# Patient Record
Sex: MALE | Race: WHITE | NOT HISPANIC OR LATINO | Employment: FULL TIME | URBAN - METROPOLITAN AREA
[De-identification: names, ages, dates, MRNs, and addresses within clinical notes are randomized per-mention and may not be internally consistent; named-entity substitution may affect disease eponyms.]

---

## 2017-07-10 ENCOUNTER — HOSPITAL ENCOUNTER (EMERGENCY)
Facility: HOSPITAL | Age: 27
Discharge: HOME | End: 2017-07-10
Attending: EMERGENCY MEDICINE | Admitting: EMERGENCY MEDICINE

## 2017-07-10 ENCOUNTER — APPOINTMENT (OUTPATIENT)
Dept: CT IMAGING | Facility: HOSPITAL | Age: 27
End: 2017-07-10

## 2017-07-10 VITALS
RESPIRATION RATE: 16 BRPM | WEIGHT: 207 LBS | DIASTOLIC BLOOD PRESSURE: 83 MMHG | HEIGHT: 73 IN | SYSTOLIC BLOOD PRESSURE: 128 MMHG | HEART RATE: 53 BPM | OXYGEN SATURATION: 99 % | TEMPERATURE: 97.9 F | BODY MASS INDEX: 27.43 KG/M2

## 2017-07-10 DIAGNOSIS — R79.89 ELEVATED SERUM CREATININE: ICD-10-CM

## 2017-07-10 DIAGNOSIS — N23 RENAL COLIC ON RIGHT SIDE: ICD-10-CM

## 2017-07-10 DIAGNOSIS — N13.30 HYDRONEPHROSIS, RIGHT: ICD-10-CM

## 2017-07-10 DIAGNOSIS — N20.1 URETEROLITHIASIS: Primary | ICD-10-CM

## 2017-07-10 LAB
ALBUMIN SERPL-MCNC: 5 G/DL (ref 3.2–4.8)
ALBUMIN/GLOB SERPL: 1.8 G/DL (ref 1.5–2.5)
ALP SERPL-CCNC: 94 U/L (ref 25–100)
ALT SERPL W P-5'-P-CCNC: 27 U/L (ref 7–40)
ANION GAP SERPL CALCULATED.3IONS-SCNC: 15 MMOL/L (ref 3–11)
AST SERPL-CCNC: 40 U/L (ref 0–33)
BASOPHILS # BLD AUTO: 0.03 10*3/MM3 (ref 0–0.2)
BASOPHILS NFR BLD AUTO: 0.3 % (ref 0–1)
BILIRUB SERPL-MCNC: 0.9 MG/DL (ref 0.3–1.2)
BILIRUB UR QL STRIP: NEGATIVE
BUN BLD-MCNC: 25 MG/DL (ref 9–23)
BUN/CREAT SERPL: 15.6 (ref 7–25)
CALCIUM SPEC-SCNC: 10.6 MG/DL (ref 8.7–10.4)
CHLORIDE SERPL-SCNC: 100 MMOL/L (ref 99–109)
CLARITY UR: CLEAR
CO2 SERPL-SCNC: 25 MMOL/L (ref 20–31)
COLOR UR: YELLOW
CREAT BLD-MCNC: 1.6 MG/DL (ref 0.6–1.3)
DEPRECATED RDW RBC AUTO: 41.5 FL (ref 37–54)
EOSINOPHIL # BLD AUTO: 0.03 10*3/MM3 (ref 0–0.3)
EOSINOPHIL NFR BLD AUTO: 0.3 % (ref 0–3)
ERYTHROCYTE [DISTWIDTH] IN BLOOD BY AUTOMATED COUNT: 13 % (ref 11.3–14.5)
GFR SERPL CREATININE-BSD FRML MDRD: 52 ML/MIN/1.73
GLOBULIN UR ELPH-MCNC: 2.8 GM/DL
GLUCOSE BLD-MCNC: 107 MG/DL (ref 70–100)
GLUCOSE UR STRIP-MCNC: NEGATIVE MG/DL
HCT VFR BLD AUTO: 45.1 % (ref 38.9–50.9)
HGB BLD-MCNC: 14.8 G/DL (ref 13.1–17.5)
HGB UR QL STRIP.AUTO: NEGATIVE
HOLD SPECIMEN: NORMAL
HOLD SPECIMEN: NORMAL
IMM GRANULOCYTES # BLD: 0.03 10*3/MM3 (ref 0–0.03)
IMM GRANULOCYTES NFR BLD: 0.3 % (ref 0–0.6)
KETONES UR QL STRIP: ABNORMAL
LEUKOCYTE ESTERASE UR QL STRIP.AUTO: NEGATIVE
LIPASE SERPL-CCNC: 21 U/L (ref 6–51)
LYMPHOCYTES # BLD AUTO: 1.31 10*3/MM3 (ref 0.6–4.8)
LYMPHOCYTES NFR BLD AUTO: 14.4 % (ref 24–44)
MCH RBC QN AUTO: 28.6 PG (ref 27–31)
MCHC RBC AUTO-ENTMCNC: 32.8 G/DL (ref 32–36)
MCV RBC AUTO: 87.2 FL (ref 80–99)
MONOCYTES # BLD AUTO: 0.9 10*3/MM3 (ref 0–1)
MONOCYTES NFR BLD AUTO: 9.9 % (ref 0–12)
NEUTROPHILS # BLD AUTO: 6.79 10*3/MM3 (ref 1.5–8.3)
NEUTROPHILS NFR BLD AUTO: 74.8 % (ref 41–71)
NITRITE UR QL STRIP: NEGATIVE
PH UR STRIP.AUTO: 6.5 [PH] (ref 5–8)
PLATELET # BLD AUTO: 203 10*3/MM3 (ref 150–450)
PMV BLD AUTO: 11.1 FL (ref 6–12)
POTASSIUM BLD-SCNC: 4.1 MMOL/L (ref 3.5–5.5)
PROT SERPL-MCNC: 7.8 G/DL (ref 5.7–8.2)
PROT UR QL STRIP: ABNORMAL
RBC # BLD AUTO: 5.17 10*6/MM3 (ref 4.2–5.76)
SODIUM BLD-SCNC: 140 MMOL/L (ref 132–146)
SP GR UR STRIP: >=1.03 (ref 1–1.03)
UROBILINOGEN UR QL STRIP: ABNORMAL
WBC NRBC COR # BLD: 9.09 10*3/MM3 (ref 3.5–10.8)
WHOLE BLOOD HOLD SPECIMEN: NORMAL
WHOLE BLOOD HOLD SPECIMEN: NORMAL

## 2017-07-10 PROCEDURE — 80053 COMPREHEN METABOLIC PANEL: CPT | Performed by: EMERGENCY MEDICINE

## 2017-07-10 PROCEDURE — 25010000002 ONDANSETRON PER 1 MG: Performed by: EMERGENCY MEDICINE

## 2017-07-10 PROCEDURE — 96361 HYDRATE IV INFUSION ADD-ON: CPT

## 2017-07-10 PROCEDURE — 96376 TX/PRO/DX INJ SAME DRUG ADON: CPT

## 2017-07-10 PROCEDURE — 96375 TX/PRO/DX INJ NEW DRUG ADDON: CPT

## 2017-07-10 PROCEDURE — 74176 CT ABD & PELVIS W/O CONTRAST: CPT

## 2017-07-10 PROCEDURE — 96374 THER/PROPH/DIAG INJ IV PUSH: CPT

## 2017-07-10 PROCEDURE — 85025 COMPLETE CBC W/AUTO DIFF WBC: CPT | Performed by: EMERGENCY MEDICINE

## 2017-07-10 PROCEDURE — 81003 URINALYSIS AUTO W/O SCOPE: CPT | Performed by: EMERGENCY MEDICINE

## 2017-07-10 PROCEDURE — 83690 ASSAY OF LIPASE: CPT | Performed by: EMERGENCY MEDICINE

## 2017-07-10 PROCEDURE — 25010000002 HYDROMORPHONE PER 4 MG: Performed by: EMERGENCY MEDICINE

## 2017-07-10 PROCEDURE — 99284 EMERGENCY DEPT VISIT MOD MDM: CPT

## 2017-07-10 RX ORDER — ONDANSETRON 2 MG/ML
4 INJECTION INTRAMUSCULAR; INTRAVENOUS ONCE
Status: COMPLETED | OUTPATIENT
Start: 2017-07-10 | End: 2017-07-10

## 2017-07-10 RX ORDER — ONDANSETRON 4 MG/1
4 TABLET, FILM COATED ORAL EVERY 6 HOURS PRN
Qty: 15 TABLET | Refills: 0 | OUTPATIENT
Start: 2017-07-10 | End: 2019-05-09

## 2017-07-10 RX ORDER — TAMSULOSIN HYDROCHLORIDE 0.4 MG/1
1 CAPSULE ORAL DAILY
Qty: 5 CAPSULE | Refills: 0 | OUTPATIENT
Start: 2017-07-10 | End: 2019-05-09

## 2017-07-10 RX ORDER — HYDROCODONE BITARTRATE AND ACETAMINOPHEN 5; 325 MG/1; MG/1
1 TABLET ORAL EVERY 6 HOURS PRN
Qty: 20 TABLET | Refills: 0 | OUTPATIENT
Start: 2017-07-10 | End: 2019-05-09

## 2017-07-10 RX ORDER — SODIUM CHLORIDE 0.9 % (FLUSH) 0.9 %
10 SYRINGE (ML) INJECTION AS NEEDED
Status: DISCONTINUED | OUTPATIENT
Start: 2017-07-10 | End: 2017-07-10 | Stop reason: HOSPADM

## 2017-07-10 RX ORDER — DEXTROAMPHETAMINE SACCHARATE, AMPHETAMINE ASPARTATE, DEXTROAMPHETAMINE SULFATE AND AMPHETAMINE SULFATE 2.5; 2.5; 2.5; 2.5 MG/1; MG/1; MG/1; MG/1
12.5 TABLET ORAL DAILY
COMMUNITY
End: 2019-07-31 | Stop reason: HOSPADM

## 2017-07-10 RX ORDER — HYDROMORPHONE HYDROCHLORIDE 1 MG/ML
0.5 INJECTION, SOLUTION INTRAMUSCULAR; INTRAVENOUS; SUBCUTANEOUS ONCE
Status: COMPLETED | OUTPATIENT
Start: 2017-07-10 | End: 2017-07-10

## 2017-07-10 RX ADMIN — HYDROMORPHONE HYDROCHLORIDE 0.5 MG: 1 INJECTION, SOLUTION INTRAMUSCULAR; INTRAVENOUS; SUBCUTANEOUS at 08:46

## 2017-07-10 RX ADMIN — HYDROMORPHONE HYDROCHLORIDE 1 MG: 1 INJECTION, SOLUTION INTRAMUSCULAR; INTRAVENOUS; SUBCUTANEOUS at 07:56

## 2017-07-10 RX ADMIN — SODIUM CHLORIDE 1000 ML: 9 INJECTION, SOLUTION INTRAVENOUS at 07:50

## 2017-07-10 RX ADMIN — ONDANSETRON 4 MG: 2 INJECTION INTRAMUSCULAR; INTRAVENOUS at 07:52

## 2017-07-10 NOTE — DISCHARGE INSTRUCTIONS
Push fluids for the next couple of days.    Establish primary care with Dr. Micah Garcia.    Follow up with Dr. Ayesha Alvarez, urology this week.    Recheck your creatinine in the next two to three days.    Immediately return to the emergency department for any new or worsening symptoms.     Information regarding Risks and Benefits When using Opioids and Other Controlled Substances to include Storage and Disposal of Medications    When considering the use of opioids and other controlled substances for the control of pain, anxiety, or for other medical purposes, you need to know of not only the benefits of these drugs but also of potential risks in using these drugs. These drugs, as well as more drugs, have beneficial uses; which is why their use is being considered in your   care, but they have risks involved in their use, too.    Opioids:  Opioids such as hydrocodone, oxycodone, hydromorphone, and codeine are pain relieving drugs, some more potent than others. They are most useful for moderate to more severe painful conditions. Risks include sedation, loss of coordination, decreased concentration, and decreased breathing with possibility of loss of consciousness or even death, especially if used in doses higher than prescribed. Improper usage can lead to addiction, tolerance, or overdose. In addition, many of these drugs are combined with acetaminophen (Tylenol) which can damage or destroy our liver when used excessively.  Alternatives to opioids are useful for mild to moderate pain and include ibuprofen (Motrin), naproxen (Aleve), aspirin, and acetaminophen (Tylenol). As with other drugs, these medications should be used according to directions on the label or from your doctor, as overuse can cause harm.    Benzodiazepines:  This group of drugs include: alprazolam (Xanax), diazepam (Valium), lorazepam (Ativan), and clonazepam (Klonopin). These drugs are used to control anxiety symptoms including anxiety and panic  attacks. Risks using these drugs include: sedation, loss of coordination, decreased ability to concentrate, effects on memory, and decreased breathing with possibility of loss of consciousness or even death. Improper and prolonged usage can lead to addiction. An alternative without addiction potential is hydroxyzine (Vistrail).    Other Controlled Substance:  This group includes Soma, Tramadol, stimulant drugs such as Ritalin, and others. Stimulant drugs are not medications that are prescribed by ER doctors. Soma and Tramadol have sedative and addictive affects similar to opioids with the same dangers mentioned with them.    Overdose:  If you or someone else are concerned that overdose has occurred, call 911 for transportation to the nearest hospital.    Storage and Disposal:  All medications need to be kept out of the reach of children or adults who cannot manage their own medicines. In addition, controlled substances can be targeted by criminals so extra precautions need to be taken to keep them in a safe, secure place. Any unused medications should be disposed of by flushing them down the toilet in the home setting or contact your local pharmacy.

## 2017-07-10 NOTE — ED PROVIDER NOTES
Subjective   HPI Comments: This patient is a 26-year-old male who complains of abdominal pain starting last night.  He tells me the pain started in the right flank area and radiates now down to the right inguinal area.  He has no history of hernias.  No history of gallbladder or pancreas abnormalities.  No history of appendicitis.  No history of kidney stones.  Patient states that he has had no vomiting no diarrhea.  Positive urinary frequency.  No change in color of the urine.  No dysuria.  The patient denies any recent trauma.  He does lift weights but does not have any other history of trauma.  He denies headache or vision changes.  Denies fevers or chills.  The pain started last night and has been coming in waves.  The ways last approximately 10 minutes.  He has had no history of similar complaints.  He does have a family history of kidney stones in his father but otherwise no personal history.  He denies any abdominal complaint on the left side of his abdomen.  He denies any discoloration of his stools or urine.  In summary, this is a 26-year-old gentleman with right-sided flank pain radiating around the right that started last night and has been ongoing times approximately 8 hours.    Past Medical History: No past medical history     Past Family History: Paternal kidney stones, maternal hx of SVT    Patient is a 26 y.o. male presenting with flank pain.   History provided by:  Patient  Flank Pain   Pain location:  R flank  Pain radiates to:  Epigastric region  Pain severity:  Unable to specify  Onset quality:  Sudden  Duration: Onset 2330 last night.  Timing:  Intermittent  Progression:  Worsening  Chronicity:  New  Relieved by:  None tried  Worsened by:  Nothing  Ineffective treatments:  None tried  Associated symptoms: no chest pain, no chills, no diarrhea, no dysuria, no fatigue, no fever, no hematuria, no nausea, no shortness of breath and no vomiting        Review of Systems   Constitutional: Negative.   Negative for chills, fatigue, fever and unexpected weight change.   HENT: Negative for dental problem, ear pain, hearing loss and sinus pressure.    Eyes: Negative for pain and visual disturbance.   Respiratory: Negative for chest tightness and shortness of breath.    Cardiovascular: Negative for chest pain, palpitations and leg swelling.   Gastrointestinal: Positive for abdominal pain. Negative for diarrhea, nausea, rectal pain and vomiting.   Genitourinary: Positive for flank pain, frequency and testicular pain. Negative for difficulty urinating, dysuria, hematuria and urgency.   Musculoskeletal: Positive for back pain. Negative for myalgias, neck pain and neck stiffness.   Neurological: Negative for seizures, syncope, speech difficulty, light-headedness and headaches.   Psychiatric/Behavioral: Negative for confusion.   All other systems reviewed and are negative.      Past Medical History:   Diagnosis Date   • ADHD (attention deficit hyperactivity disorder)    • Kidney stone        Allergies   Allergen Reactions   • Penicillins        Past Surgical History:   Procedure Laterality Date   • DENTAL PROCEDURE         History reviewed. No pertinent family history.    Social History     Social History   • Marital status: Single     Spouse name: N/A   • Number of children: N/A   • Years of education: N/A     Social History Main Topics   • Smoking status: Current Some Day Smoker   • Smokeless tobacco: None   • Alcohol use Yes      Comment: OCCASIONALLY   • Drug use: No   • Sexual activity: Not Asked     Other Topics Concern   • None     Social History Narrative   • None         Objective   Physical Exam   Constitutional: He is oriented to person, place, and time. He appears well-developed.  Non-toxic appearance. No distress.   HENT:   Head: Normocephalic and atraumatic.   Right Ear: Tympanic membrane, external ear and ear canal normal.   Left Ear: Tympanic membrane, external ear and ear canal normal.   Nose: Nose normal.  No nasal septal hematoma.   Mouth/Throat: Oropharynx is clear and moist. Mucous membranes are not dry. No oral lesions. No trismus in the jaw. No dental abscesses or uvula swelling. No posterior oropharyngeal erythema or tonsillar abscesses. No tonsillar exudate.   Eyes: EOM are normal. Pupils are equal, round, and reactive to light. Right conjunctiva is not injected. Left conjunctiva is not injected.   Neck: Normal range of motion. Neck supple. No JVD present. No tracheal tenderness present. No rigidity. Normal range of motion present.   Cardiovascular: Normal rate, regular rhythm and normal heart sounds.  Exam reveals no gallop and no friction rub.    Pulmonary/Chest: Effort normal and breath sounds normal. He has no wheezes. He has no rales. He exhibits no tenderness.   Abdominal: Soft. Bowel sounds are normal. He exhibits no distension, no pulsatile midline mass and no mass. There is tenderness (Right CVA). There is no rigidity, no rebound, no guarding and no tenderness at McBurney's point.   No signs of acute abdomen.  No pain at McBurney's point.  No pulsatile abdominal mass     Musculoskeletal: Normal range of motion. He exhibits no edema, tenderness or deformity.   Lymphadenopathy:     He has no cervical adenopathy.   Neurological: He is alert and oriented to person, place, and time. He has normal strength. He displays no tremor. No cranial nerve deficit. He exhibits normal muscle tone.   5/5 strength bilaterally with flexion and extension of fingers, wrist, elbows, knees and hips as well as plantar and dorsiflexion of the foot.     Skin: Skin is warm and dry. No rash noted. No erythema.   Psychiatric: He has a normal mood and affect. His speech is normal and behavior is normal. Judgment and thought content normal. He is attentive.   Nursing note and vitals reviewed.      Procedures         ED Course  ED Course   Comment By Time   The patient's mother is a nurse, which she informed me upon history taking.  I  "had a good conversation with her and the patient.  We have talked about the differential diagnosis in great detail.  I have explained the risks and benefits of CT imaging.  I've ordered a CT scan of the abdomen and pelvis and I have also ordered pain medication and antibiotic medication for the patient.  Patient has been able to verbalize an understanding of the plan.  All questions have been answered.  I do despite a discharge home.  I plan to have the patient follow-up with Dr. Micah Garcia M.D. to establish care from a primary care standpoint. Wayne Mcgregor MD 07/10 0725   Dr. Mcgregor is at the bedside re-evaluating the pt. [facility] 07/10 0822   SUMIT query complete. Treatment plan to include limited course of prescribed  controlled substance. Risks including addiction, benefits, and alternatives presented to patient. -KEVIN [facility] 07/10 0837   I reexamined the patient at approximately 8:30 AM.  I let he and his mother know about the results of the studies.  Specifically, I let the patient know about the positive proximal right ureteral stone.  We talked about the elevated creatinine, we talked about the hydronephrosis.  I talked about the need for repeat laboratory studies in the next 1-2 days.  We also talked very specifically about the importance of following up with urology in the next couple of days.  Certainly, if the patient's creatinine continues to climb, the patient may require urologic procedure for stone extraction.  The patient's pain is been extremely well controlled with Dilaudid, with the patient articulating that he felt \"awesome\" after medication had been provided.  I will give the patient another dose of pain medication prior to discharge but will be discharge the patient home with an impression that includes right-sided renal colic, ureterolithiasis, hydronephrosis, an elevated creatinine.  We will refer him to urology, Dr. Raul Moura, follow-up this week.  He should have his " creatinine rechecked in the next 2-3 days.  Return here immediately for new or changing concerns.  Maintain good fluid intake.  Patient and mother is a full understanding of the plan and appreciation for care.  Patient will be discharged home with his mom accordingly. Wayne Mcgregor MD 07/10 0852   No signs of infection on urinalysis. Wayne Mcgregor MD 07/10 0852     Recent Results (from the past 24 hour(s))   Comprehensive Metabolic Panel    Collection Time: 07/10/17  7:13 AM   Result Value Ref Range    Glucose 107 (H) 70 - 100 mg/dL    BUN 25 (H) 9 - 23 mg/dL    Creatinine 1.60 (H) 0.60 - 1.30 mg/dL    Sodium 140 132 - 146 mmol/L    Potassium 4.1 3.5 - 5.5 mmol/L    Chloride 100 99 - 109 mmol/L    CO2 25.0 20.0 - 31.0 mmol/L    Calcium 10.6 (H) 8.7 - 10.4 mg/dL    Total Protein 7.8 5.7 - 8.2 g/dL    Albumin 5.00 (H) 3.20 - 4.80 g/dL    ALT (SGPT) 27 7 - 40 U/L    AST (SGOT) 40 (H) 0 - 33 U/L    Alkaline Phosphatase 94 25 - 100 U/L    Total Bilirubin 0.9 0.3 - 1.2 mg/dL    eGFR Non African Amer 52 (L) >60 mL/min/1.73    Globulin 2.8 gm/dL    A/G Ratio 1.8 1.5 - 2.5 g/dL    BUN/Creatinine Ratio 15.6 7.0 - 25.0    Anion Gap 15.0 (H) 3.0 - 11.0 mmol/L   Lipase    Collection Time: 07/10/17  7:13 AM   Result Value Ref Range    Lipase 21 6 - 51 U/L   Light Blue Top    Collection Time: 07/10/17  7:13 AM   Result Value Ref Range    Extra Tube hold for add-on    Green Top (Gel)    Collection Time: 07/10/17  7:13 AM   Result Value Ref Range    Extra Tube Hold for add-ons.    Lavender Top    Collection Time: 07/10/17  7:13 AM   Result Value Ref Range    Extra Tube hold for add-on    Gold Top - SST    Collection Time: 07/10/17  7:13 AM   Result Value Ref Range    Extra Tube Hold for add-ons.    CBC Auto Differential    Collection Time: 07/10/17  7:13 AM   Result Value Ref Range    WBC 9.09 3.50 - 10.80 10*3/mm3    RBC 5.17 4.20 - 5.76 10*6/mm3    Hemoglobin 14.8 13.1 - 17.5 g/dL    Hematocrit 45.1 38.9 - 50.9 %    MCV  87.2 80.0 - 99.0 fL    MCH 28.6 27.0 - 31.0 pg    MCHC 32.8 32.0 - 36.0 g/dL    RDW 13.0 11.3 - 14.5 %    RDW-SD 41.5 37.0 - 54.0 fl    MPV 11.1 6.0 - 12.0 fL    Platelets 203 150 - 450 10*3/mm3    Neutrophil % 74.8 (H) 41.0 - 71.0 %    Lymphocyte % 14.4 (L) 24.0 - 44.0 %    Monocyte % 9.9 0.0 - 12.0 %    Eosinophil % 0.3 0.0 - 3.0 %    Basophil % 0.3 0.0 - 1.0 %    Immature Grans % 0.3 0.0 - 0.6 %    Neutrophils, Absolute 6.79 1.50 - 8.30 10*3/mm3    Lymphocytes, Absolute 1.31 0.60 - 4.80 10*3/mm3    Monocytes, Absolute 0.90 0.00 - 1.00 10*3/mm3    Eosinophils, Absolute 0.03 0.00 - 0.30 10*3/mm3    Basophils, Absolute 0.03 0.00 - 0.20 10*3/mm3    Immature Grans, Absolute 0.03 0.00 - 0.03 10*3/mm3   Urinalysis With / Culture If Indicated    Collection Time: 07/10/17  7:43 AM   Result Value Ref Range    Color, UA Yellow Yellow, Straw    Appearance, UA Clear Clear    pH, UA 6.5 5.0 - 8.0    Specific Gravity, UA >=1.030 1.001 - 1.030    Glucose, UA Negative Negative    Ketones, UA 15 mg/dL (1+) (A) Negative    Bilirubin, UA Negative Negative    Blood, UA Negative Negative    Protein, UA Trace (A) Negative    Leuk Esterase, UA Negative Negative    Nitrite, UA Negative Negative    Urobilinogen, UA 1.0 E.U./dL 0.2 - 1.0 E.U./dL     Note: In addition to lab results from this visit, the labs listed above may include labs taken at another facility or during a different encounter within the last 24 hours. Please correlate lab times with ED admission and discharge times for further clarification of the services performed during this visit.    CT Abdomen Pelvis Without Contrast   Final Result   4 mm moderately obstructing right proximal ureteral stone   with moderate hydronephrosis of the right kidney and perinephric   stranding.       D:  07/10/2017   E:  07/10/2017           This report was finalized on 7/10/2017 4:03 PM by Dr. Lucia Collins MD.            Vitals:    07/10/17 0706 07/10/17 0830 07/10/17 0845   BP:  "168/81 128/83    BP Location: Left arm     Patient Position: Sitting     Pulse: 63 (!) 47 53   Resp: 16     Temp: 97.9 °F (36.6 °C)     TempSrc: Oral     SpO2: 99% 94% 99%   Weight: 207 lb (93.9 kg)     Height: 73\" (185.4 cm)       Medications   sodium chloride 0.9 % bolus 1,000 mL (0 mL Intravenous Stopped 7/10/17 0919)   ondansetron (ZOFRAN) injection 4 mg (4 mg Intravenous Given 7/10/17 0752)   HYDROmorphone (DILAUDID) injection 1 mg (1 mg Intravenous Given 7/10/17 0756)   HYDROmorphone (DILAUDID) injection 0.5 mg (0.5 mg Intravenous Given 7/10/17 0870)     ECG/EMG Results (last 24 hours)     ** No results found for the last 24 hours. **                          MDM    Final diagnoses:   Ureterolithiasis   Hydronephrosis, right   Elevated serum creatinine   Renal colic on right side   EMR Dragon/Transcription disclaimer:  Much of this encounter note is an electronic transcription/translation of spoken language to printed text. The electronic translation of spoken language may permit erroneous, or at times, nonsensical words or phrases to be inadvertently transcribed; Although I have reviewed the note for such errors, some may still exist.      Documentation assistance provided by josé miguel BARTHOLOMEW.  Information recorded by the josé miguel was done at my direction and has been verified and validated by me.     Ana Bartholomew  07/10/17 0722       Ana Bartholomew  07/10/17 0727       Wayne Mcgregor MD  07/10/17 5727    "

## 2019-05-10 ENCOUNTER — OFFICE VISIT (OUTPATIENT)
Dept: ORTHOPEDIC SURGERY | Facility: CLINIC | Age: 29
End: 2019-05-10

## 2019-05-10 VITALS — WEIGHT: 188.05 LBS | OXYGEN SATURATION: 98 % | BODY MASS INDEX: 24.92 KG/M2 | HEART RATE: 91 BPM | HEIGHT: 73 IN

## 2019-05-10 DIAGNOSIS — M20.011 MALLET FINGER OF RIGHT HAND: Primary | ICD-10-CM

## 2019-05-10 PROCEDURE — 99204 OFFICE O/P NEW MOD 45 MIN: CPT | Performed by: ORTHOPAEDIC SURGERY

## 2019-05-10 RX ORDER — DEXTROAMPHETAMINE SACCHARATE, AMPHETAMINE ASPARTATE, DEXTROAMPHETAMINE SULFATE AND AMPHETAMINE SULFATE 2.5; 2.5; 2.5; 2.5 MG/1; MG/1; MG/1; MG/1
TABLET ORAL
COMMUNITY
End: 2021-05-14

## 2019-05-10 NOTE — PROGRESS NOTES
[facility] Note    Subjective     Chief Complaint   Patient presents with   • Left Hand - Pain     Small finger        HPI      David Ramesh is a 28 y.o. male.  He was playing volleyball and injured his right small finger DIP joint with a mallet finger deformity.  Pain is 3 out of 10.  He was treated with a splint.  He had a history of a mallet finger on his left hand that was never treated appropriately and he does not want to be left with a deformity to his right small finger        Past Medical History:   Diagnosis Date   • ADHD (attention deficit hyperactivity disorder)    • Kidney stone       Past Surgical History:   Procedure Laterality Date   • DENTAL PROCEDURE        History reviewed. No pertinent family history.  Social History     Socioeconomic History   • Marital status: Single     Spouse name: Not on file   • Number of children: Not on file   • Years of education: Not on file   • Highest education level: Not on file   Tobacco Use   • Smoking status: Current Some Day Smoker   • Smokeless tobacco: Never Used   Substance and Sexual Activity   • Alcohol use: Yes     Comment: OCCASIONALLY   • Drug use: No      Current Outpatient Medications on File Prior to Visit   Medication Sig Dispense Refill   • amphetamine-dextroamphetamine (ADDERALL) 10 MG tablet Take 12.5 mg by mouth Daily.     • amphetamine-dextroamphetamine (ADDERALL) 10 MG tablet Adderall       No current facility-administered medications on file prior to visit.       Allergies   Allergen Reactions   • Penicillins         The following portions of the patient's history were reviewed and updated as appropriate: allergies, current medications, past family history, past medical history, past social history, past surgical history and problem list.    Review of Systems   Constitutional: Negative.    HENT: Negative.    Eyes: Negative.    Respiratory: Negative.    Cardiovascular: Negative.    Gastrointestinal: Negative.   "  Endocrine: Negative.    Genitourinary: Negative.    Musculoskeletal: Positive for arthralgias and joint swelling.   Skin: Negative.    Allergic/Immunologic: Negative.    Neurological: Negative.    Hematological: Negative.    Psychiatric/Behavioral: Negative.         Objective      Physical Exam  Pulse 91   Ht 185 cm (72.84\")   Wt 85.3 kg (188 lb 0.8 oz)   SpO2 98%   BMI 24.92 kg/m²     Body mass index is 24.92 kg/m².        GENERAL APPEARANCE: awake, alert & oriented x 3, in no acute distress and well developed, well nourished  PSYCH: normal mood and affect  LUNGS:  breathing nonlabored, no wheezing  EYES: sclera anicteric, pupils equal  CARDIOVASCULAR: palpable pulses dorsalis pedis, palpable posterior tibial bilaterally. Capillary refill less than 2 seconds  INTEGUMENTARY: skin intact, no clubbing, cyanosis  NEUROLOGIC:  Normal Sensation and reflexes             Ortho Exam  Neurologic   Left Upper Extremity    Left wrist extensors: 5/5    Left wrist flexors: 5/5    Left intrinsics: 5/5      Right Upper Extremity    Right wrist extensors: 5/5    Right wrist flexors: 5/5    Right intrinsics: 5/5    Musculoskeletal   Left Elbow    Forearm supination: AROM - 90 degrees    Forearm pronation: AROM - 90 degrees     Right Elbow    Forearm supination: AROM - 90 degrees    Forearm pronation: AROM - 90 degrees     Left Wrist    Flexion: AROM - 90 degrees    Extension: AROM - 90 degrees     Right Wrist    Flexion: AROM - 90 degrees    Extension: AROM - 90 degrees     Inspection and Palpation   Right Hand:      Tenderness -DIP joint with mallet finger deformity    Swelling - none    Crepitus - none    Muscle tone - no atrophy     Left Hand    Tenderness - none    Swelling - none    Crepitus - none    Muscle tone - no atrophy     Strength and Tone    Right  strength: good    Left  strength: good     Deformities, Malalignments, Discrepancies    None       Imaging/Studies  Imaging Results (last 7 days)     ** No " results found for the last 168 hours. **        I viewed his x-rays from May 9 which show a mallet finger fracture of the right small finger  Assessment/Plan        ICD-10-CM ICD-9-CM   1. Mallet finger of right hand M20.011 736.1     We discussed the treatment plan of splinting for 6 weeks in extension.  He will follow-up in 2 weeks with x-rays to make sure he is healing well and does not displace further.  I will see him back in 2 weeks.  Medical Decision Making  Management Options : over-the-counter medicine and close treatment of fracture or dislocation  Data/Risk: radiology tests and independent visualization of imaging, lab tests, or EMG/NCV    Jose Francisco Mcrae MD  05/10/19  10:42 AM         EMR Dragon/Transcription disclaimer:  Much of this encounter note is an electronic transcription of spoken language to printed text. Electronic transcription of spoken language may permit erroneous, or at times, nonsensical words or phrases to be inadvertently transcribed. Although I have reviewed the note for such errors, some may still exist.

## 2019-05-24 ENCOUNTER — OFFICE VISIT (OUTPATIENT)
Dept: ORTHOPEDIC SURGERY | Facility: CLINIC | Age: 29
End: 2019-05-24

## 2019-05-24 VITALS — BODY MASS INDEX: 24.92 KG/M2 | HEART RATE: 99 BPM | WEIGHT: 188.05 LBS | OXYGEN SATURATION: 100 % | HEIGHT: 73 IN

## 2019-05-24 DIAGNOSIS — M20.011 MALLET FINGER OF RIGHT HAND: Primary | ICD-10-CM

## 2019-05-24 PROCEDURE — 99213 OFFICE O/P EST LOW 20 MIN: CPT | Performed by: ORTHOPAEDIC SURGERY

## 2019-05-24 NOTE — PROGRESS NOTES
[facility] [place] [facility] Note    Subjective     Chief Complaint   Patient presents with   • Follow-up     2 week follow up - Mallet finger of right hand        HPI      David Ramesh is a 28 y.o. male.  He is follow-up right small finger mallet fracture.  7 2 weeks ago.  Pain is 3 out of 10 and throbbing.  He is in a brace.        Past Medical History:   Diagnosis Date   • ADHD (attention deficit hyperactivity disorder)    • Kidney stone       Past Surgical History:   Procedure Laterality Date   • DENTAL PROCEDURE        No family history on file.  Social History     Socioeconomic History   • Marital status: Single     Spouse name: Not on file   • Number of children: Not on file   • Years of education: Not on file   • Highest education level: Not on file   Tobacco Use   • Smoking status: Current Some Day Smoker   • Smokeless tobacco: Never Used   Substance and Sexual Activity   • Alcohol use: Yes     Comment: OCCASIONALLY   • Drug use: No      Current Outpatient Medications on File Prior to Visit   Medication Sig Dispense Refill   • amphetamine-dextroamphetamine (ADDERALL) 10 MG tablet Take 12.5 mg by mouth Daily.     • amphetamine-dextroamphetamine (ADDERALL) 10 MG tablet Adderall       No current facility-administered medications on file prior to visit.       Allergies   Allergen Reactions   • Penicillins         The following portions of the patient's history were reviewed and updated as appropriate: allergies, current medications, past family history, past medical history, past social history, past surgical history and problem list.    Review of Systems   Constitutional: Negative.    HENT: Negative.    Eyes: Negative.    Respiratory: Negative.    Cardiovascular: Negative.    Gastrointestinal: Negative.    Endocrine: Negative.    Genitourinary: Negative.    Musculoskeletal: Positive for joint swelling.        Mallet finger of right hand   Skin: Negative.    Allergic/Immunologic: Negative.   "  Neurological: Negative.    Hematological: Negative.    Psychiatric/Behavioral: Negative.         Objective      Physical Exam  Pulse 99   Ht 185 cm (72.84\")   Wt 85.3 kg (188 lb 0.8 oz)   SpO2 100%   BMI 24.92 kg/m²     Body mass index is 24.92 kg/m².        GENERAL APPEARANCE: awake, alert & oriented x 3, in no acute distress and well developed, well nourished  PSYCH: normal mood and affect      Ortho Exam  His right small finger has straighter appearance.  The skin is intact.  He is neurovascular intact.  Strength and motion not tested because of the fracture.    Imaging/Studies  Imaging Results (last 7 days)     Procedure Component Value Units Date/Time    XR Finger 2+ View Right [[id number]] Resulted:  05/24/19 1009     Updated:  05/24/19 1010    Narrative:       Right Hand small finger x-Ray  Indication: Pain  AP, Lateral,  views    Findings:  Healing fracture of right small finger mallet fracture  No bony lesion  Normal soft tissues  Normal joint spaces    prior studies were available for comparison.            Assessment/Plan        ICD-10-CM ICD-9-CM   1. Mallet finger of right hand M20.011 736.1       Orders Placed This Encounter   Procedures   • XR Finger 2+ View Right      He will continue the mallet finger splint.  I re-taped it for him today personally.  He will follow-up in 4 weeks for an x-ray.  Medical Decision Making  Management Options : over-the-counter medicine and close treatment of fracture or dislocation  Data/Risk: radiology tests and independent visualization of imaging, lab tests, or EMG/NCV    Jose Francisco Mcrae MD  05/24/19  10:10 AM         EMR Dragon/Transcription disclaimer:  Much of this encounter note is an electronic transcription of spoken language to printed text. Electronic transcription of spoken language may permit erroneous, or at times, nonsensical words or phrases to be inadvertently transcribed. Although I have reviewed the note for such errors, some may still " exist.

## 2019-06-21 ENCOUNTER — OFFICE VISIT (OUTPATIENT)
Dept: ORTHOPEDIC SURGERY | Facility: CLINIC | Age: 29
End: 2019-06-21

## 2019-06-21 VITALS — OXYGEN SATURATION: 99 % | BODY MASS INDEX: 24.51 KG/M2 | WEIGHT: 184.97 LBS | HEART RATE: 78 BPM | HEIGHT: 73 IN

## 2019-06-21 DIAGNOSIS — M20.011 MALLET FINGER OF RIGHT HAND: ICD-10-CM

## 2019-06-21 DIAGNOSIS — M79.645 FINGER PAIN, LEFT: ICD-10-CM

## 2019-06-21 DIAGNOSIS — M20.022 BOUTONNIERE DEFORMITY OF FINGER OF LEFT HAND: ICD-10-CM

## 2019-06-21 DIAGNOSIS — Z09 FRACTURE FOLLOW-UP: Primary | ICD-10-CM

## 2019-06-21 PROCEDURE — 99213 OFFICE O/P EST LOW 20 MIN: CPT | Performed by: ORTHOPAEDIC SURGERY

## 2019-06-21 NOTE — PROGRESS NOTES
[facility] [place] [facility] [place]    Subjective     Chief Complaint   Patient presents with   • Follow-up     4 week f/u fracture of right small finger mallet fracture   • Pain     left ring finger        HPI  David Ramesh is a 28 y.o. male.  He is here to follow-up for the right hand mallet finger but his new complaint is left middle finger deformity.    Past Medical History:   Diagnosis Date   • ADHD (attention deficit hyperactivity disorder)    • Kidney stone       Past Surgical History:   Procedure Laterality Date   • DENTAL PROCEDURE        No family history on file.  Social History     Socioeconomic History   • Marital status: Single     Spouse name: Not on file   • Number of children: Not on file   • Years of education: Not on file   • Highest education level: Not on file   Tobacco Use   • Smoking status: Current Some Day Smoker   • Smokeless tobacco: Never Used   Substance and Sexual Activity   • Alcohol use: Yes     Comment: OCCASIONALLY   • Drug use: No      Current Outpatient Medications on File Prior to Visit   Medication Sig Dispense Refill   • amphetamine-dextroamphetamine (ADDERALL) 10 MG tablet Take 12.5 mg by mouth Daily.     • amphetamine-dextroamphetamine (ADDERALL) 10 MG tablet Adderall       No current facility-administered medications on file prior to visit.       Allergies   Allergen Reactions   • Penicillins         The following portions of the patient's history were reviewed and updated as appropriate: allergies, current medications, past family history, past medical history, past social history, past surgical history and problem list.    Review of Systems   Constitutional: Negative.    HENT: Negative.    Eyes: Negative.    Respiratory: Negative.    Cardiovascular: Negative.    Gastrointestinal: Negative.    Endocrine: Negative.    Genitourinary: Negative.    Musculoskeletal: Positive for arthralgias and joint swelling.   Skin: Negative.    Allergic/Immunologic: Negative.   "  Neurological: Negative.    Hematological: Negative.    Psychiatric/Behavioral: Negative.         Objective      Physical Exam  Pulse 78   Ht 185 cm (72.84\")   Wt 83.9 kg (184 lb 15.5 oz)   SpO2 99%   BMI 24.51 kg/m²     Body mass index is 24.51 kg/m².    GENERAL APPEARANCE: awake, alert & oriented x 3, in no acute distress and well developed, well nourished  PSYCH: normal mood and affect      Ortho Exam  His right hand small finger mallet finger deformity is healing.  He has stiffness at the DIP to be expected.  He gets near full extension.  His new complaint is the boutonniere deformity to his left hand middle finger which is new.  Happened after fracturing the mallet finger on his right hand.  This first time I am seeing him for the left hand.    Imaging/Studies  Imaging Results (last 7 days)     Procedure Component Value Units Date/Time    XR Finger 2+ View Left [[id number]] Resulted:  06/21/19 1054     Updated:  06/21/19 1054    Narrative:       Bilateral Hand X-Ray  Indication: Pain  AP, Lateral, and Oblique views    Findings: Soft tissue deformity of the middle finger  No fracture  No bony lesion  Normal soft tissues  Normal joint spaces    No prior studies were available for comparison.      XR Finger 2+ View Right [[id number]] Resulted:  06/21/19 1053     Updated:  06/21/19 1054    Narrative:       Right Hand X-Ray  Indication: Pain  AP, Lateral, and Oblique views    Findings:  Healing mallet finger fracture of the small finger  No bony lesion  Normal soft tissues  Normal joint spaces    prior studies were available for comparison.            Assessment/Plan        ICD-10-CM ICD-9-CM   1. Fracture follow-up Z09 V67.4   2. Finger pain, left M79.645 729.5   3. Mallet finger of right hand M20.011 736.1   4. 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  I will refer him to hand surgeon for that.  He may come out of the brace for the right small finger and start therapy range of motion.  He has chronic issues with his left middle and ring finger and recommend he follow-up with a hand surgeon for that.    Medical Decision Making  Management Options : over-the-counter medicine and close treatment of fracture or dislocation  Data/Risk: radiology tests and independent visualization of imaging, lab tests, or EMG/NCV    Jose Francisco Mcrae MD  06/21/19  10:54 AM         EMR Dragon/Transcription disclaimer:  Much of this encounter note is an electronic transcription of spoken language to printed text. Electronic transcription of spoken language may permit erroneous, or at times, nonsensical words or phrases to be inadvertently transcribed. Although I have reviewed the note for such errors, some may still exist.

## 2021-03-20 ENCOUNTER — HOSPITAL ENCOUNTER (EMERGENCY)
Age: 31
Discharge: HOME | End: 2021-03-20
Payer: COMMERCIAL

## 2021-03-20 VITALS
RESPIRATION RATE: 16 BRPM | DIASTOLIC BLOOD PRESSURE: 75 MMHG | SYSTOLIC BLOOD PRESSURE: 137 MMHG | TEMPERATURE: 97.7 F | HEART RATE: 84 BPM | OXYGEN SATURATION: 99 %

## 2021-03-20 VITALS
DIASTOLIC BLOOD PRESSURE: 33 MMHG | RESPIRATION RATE: 18 BRPM | HEART RATE: 87 BPM | TEMPERATURE: 97.7 F | SYSTOLIC BLOOD PRESSURE: 123 MMHG | OXYGEN SATURATION: 98 %

## 2021-03-20 VITALS — BODY MASS INDEX: 25.7 KG/M2

## 2021-03-20 VITALS — SYSTOLIC BLOOD PRESSURE: 126 MMHG | HEART RATE: 91 BPM | DIASTOLIC BLOOD PRESSURE: 69 MMHG

## 2021-03-20 DIAGNOSIS — S00.83XA: ICD-10-CM
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
## 2021-03-20 DIAGNOSIS — S06.0X0A: Primary | ICD-10-CM
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
## 2021-03-20 PROCEDURE — 99282 EMERGENCY DEPT VISIT SF MDM: CPT

## 2021-03-20 PROCEDURE — 90471 IMMUNIZATION ADMIN: CPT

## 2021-03-20 PROCEDURE — 70450 CT HEAD/BRAIN W/O DYE: CPT

## 2021-03-20 PROCEDURE — 90714 TD VACC NO PRESV 7 YRS+ IM: CPT

## 2021-03-20 PROCEDURE — 72125 CT NECK SPINE W/O DYE: CPT

## 2021-03-20 PROCEDURE — 70486 CT MAXILLOFACIAL W/O DYE: CPT

## 2021-05-26 ENCOUNTER — OFFICE VISIT (OUTPATIENT)
Dept: ORTHOPEDIC SURGERY | Facility: CLINIC | Age: 31
End: 2021-05-26

## 2021-05-26 VITALS
HEART RATE: 84 BPM | BODY MASS INDEX: 26.43 KG/M2 | HEIGHT: 72 IN | DIASTOLIC BLOOD PRESSURE: 82 MMHG | WEIGHT: 195.11 LBS | SYSTOLIC BLOOD PRESSURE: 146 MMHG

## 2021-05-26 DIAGNOSIS — M79.672 BILATERAL FOOT PAIN: Primary | ICD-10-CM

## 2021-05-26 DIAGNOSIS — M79.671 BILATERAL FOOT PAIN: Primary | ICD-10-CM

## 2021-05-26 PROCEDURE — 99213 OFFICE O/P EST LOW 20 MIN: CPT | Performed by: ORTHOPAEDIC SURGERY

## 2021-05-26 NOTE — PROGRESS NOTES
NEW PATIENT    Patient: David Ramesh  : 1990    Primary Care Provider: Provider, No Known    Requesting Provider: As above    Pain of the Right Foot      History    Chief Complaint: Bilateral foot pain    History of Present Illness: This is a very pleasant 30-year-old gentleman with bilateral foot problems.  He is an .  He reports that he generally walks about 2 miles a day for exercise.  Around May 5 he received some difficult news about his family and over the next several days began walking 6 to 9 miles a day.  On the  he noted pain in the right proximal midfoot.  He points to the area of the cuneiforms.  He notes that it might have been a little bit swollen.  He was seen at urgent treatment and x-ray was done 2021.  I reviewed the films and the report.  It was read as possible old stress fracture of the third metatarsal.  I looked at the films and I see the cortical thickening of the third metatarsal but I think it is normal.  He was given a short boot.  The pain has improved significantly.  He notes that at the time it was 6-8 out of 10, now there are many times when he does not feel it at all.  Second problem is he was at a softball game and a ball hit the lateral aspect of his left foot over the cuboid.  He has noted some swelling in the region of the extensor digitorum brevis.  He reports that side is more sore than the right foot.    Current Outpatient Medications on File Prior to Visit   Medication Sig Dispense Refill   • [DISCONTINUED] indomethacin (INDOCIN) 50 MG capsule Take 1 capsule by mouth 3 (Three) Times a Day As Needed for Moderate Pain . 21 capsule 0     No current facility-administered medications on file prior to visit.      Allergies   Allergen Reactions   • Penicillins Unknown (See Comments)     As a child      Past Medical History:   Diagnosis Date   • ADHD (attention deficit hyperactivity disorder)    • Kidney stone    • Multiple fractures      Past Surgical  "History:   Procedure Laterality Date   • DENTAL PROCEDURE       No family history on file.   Social History     Socioeconomic History   • Marital status: Single     Spouse name: Not on file   • Number of children: Not on file   • Years of education: Not on file   • Highest education level: Not on file   Tobacco Use   • Smoking status: Former Smoker   • Smokeless tobacco: Never Used   • Tobacco comment: Quit 2 mo ago   Vaping Use   • Vaping Use: Never used   Substance and Sexual Activity   • Alcohol use: Yes     Comment: OCCASIONALLY   • Drug use: No   • Sexual activity: Defer        Review of Systems   Constitutional: Negative.    HENT: Negative.    Eyes: Negative.    Respiratory: Negative.    Cardiovascular: Negative.    Gastrointestinal: Negative.    Endocrine: Negative.    Genitourinary: Negative.    Musculoskeletal: Positive for arthralgias.   Skin: Negative.    Allergic/Immunologic: Negative.    Neurological: Negative.    Hematological: Negative.    Psychiatric/Behavioral: Negative.        The following portions of the patient's history were reviewed and updated as appropriate: allergies, current medications, past family history, past medical history, past social history, past surgical history and problem list.    Physical Exam:   /82   Pulse 84   Ht 182.9 cm (72.01\")   Wt 88.5 kg (195 lb 1.7 oz)   BMI 26.46 kg/m²   GENERAL: Body habitus: normal weight for height    Lower extremity edema: Right: none; Left: none    Varicose veins:  Right: none; Left: none    Gait: normal     Mental Status:  awake and alert; oriented to person, place, and time    Voice:  clear  SKIN:  Lower extremity: Normal    Hair Growth(lower extremity):  Right:normal; Left:  normal  NAILS: Toenails: normal  HEENT: Head: Normocephalic, atraumatic,  without obvious abnormality.  eye: normal external eye, no icterus  ears:normal external ears  PULM:  Repiratory effort normal  CV:  Dorsalis Pedis:  Right: 2+; Left:2+    Posterior " Tibial: Right:2+; Left:2+    Capillary Refill:  Brisk  MSK:  Hand:sensation intact      Tibia:  Right:  non tender; Left:  non tender      Ankle:  Right: non tender, ROM  normal and motor function  normal; Left:  non tender, ROM  normal and motor function  normal      Foot:  Right:  No tenderness at all throughout the foot, no swelling, no pain with compression of the forefoot, normal range of motion.; Left:  Mildly tender over the cuboid and extensor brevis muscle, mild swelling in that area, no pain with compression of the foot normal range of motion no other tenderness      NEURO: Heel Walking:  Right:  normal; Left:  normal    Toe Walking:  Right:  normal; Left:  normal     Wilton-Alex 5.07 monofilament test: normal    Lower extremity sensation: intact     Reflexes:  Biceps:  Right:  not tested; Left:  not tested           Quads:  Right:  not tested; Left:  not tested           Ankle:  Right:  not tested; Left:  not tested      Calf Atrophy:none    Motor Function: all 5/5         Medical Decision Making    Data Review:   ordered and reviewed x-rays today, reviewed radiology images, reviewed radiology results and reviewed outside records    Assessment and Plan/ Diagnosis/Treatment options:   1. Bilateral foot pain  I obtained weightbearing films of her feet today.  On the right I do not see any fracture.  I explained to him that stress fractures are generally negative on the initial x-ray.  I showed them the cortical thickening of the metatarsal and I think it is just normal in a healthy active 30-year-old male.  I explained that what I think happened is he probably did develop an early stress fracture, but by having it immobilized early and decreasing activity it stopped from becoming a complete stress fracture.  Given that he is entirely asymptomatic now I think he can wean out of the boot and slowly increase activity.  With respect to the left foot I think he had a direct contusion.  I do not see any  fractures.  I will be happy to see him anytime  - XR Foot 3+ View Bilateral            Radiology Ordered []  Radiology Reports Reviewed []      Radiology Images Reviewed []   Labs Reviewed []    Labs Ordered []   PCP Records Reviewed []    Provider Records Reviewed []    ER Records Reviewed []    Hospital Records Reviewed []    History Obtained From Family []    Phone conversation with Provider []    Records Requested []        Merlene Ricketts MD